# Patient Record
Sex: FEMALE
[De-identification: names, ages, dates, MRNs, and addresses within clinical notes are randomized per-mention and may not be internally consistent; named-entity substitution may affect disease eponyms.]

---

## 2024-04-01 PROBLEM — Z00.00 ENCOUNTER FOR PREVENTIVE HEALTH EXAMINATION: Status: ACTIVE | Noted: 2024-04-01

## 2024-04-02 ENCOUNTER — APPOINTMENT (OUTPATIENT)
Dept: OTOLARYNGOLOGY | Facility: CLINIC | Age: 44
End: 2024-04-02
Payer: COMMERCIAL

## 2024-04-02 DIAGNOSIS — Z82.49 FAMILY HISTORY OF ISCHEMIC HEART DISEASE AND OTHER DISEASES OF THE CIRCULATORY SYSTEM: ICD-10-CM

## 2024-04-02 DIAGNOSIS — J32.4 CHRONIC PANSINUSITIS: ICD-10-CM

## 2024-04-02 DIAGNOSIS — R51.9 HEADACHE, UNSPECIFIED: ICD-10-CM

## 2024-04-02 DIAGNOSIS — Z80.9 FAMILY HISTORY OF MALIGNANT NEOPLASM, UNSPECIFIED: ICD-10-CM

## 2024-04-02 DIAGNOSIS — Z78.9 OTHER SPECIFIED HEALTH STATUS: ICD-10-CM

## 2024-04-02 DIAGNOSIS — Z83.3 FAMILY HISTORY OF DIABETES MELLITUS: ICD-10-CM

## 2024-04-02 PROCEDURE — 99202 OFFICE O/P NEW SF 15 MIN: CPT | Mod: 25

## 2024-04-02 PROCEDURE — 31231 NASAL ENDOSCOPY DX: CPT

## 2024-04-02 NOTE — ASSESSMENT
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  She has associated mid facial pressure, intermittent pain in her left perinasal upper lip area as well as photophobia and neck pain.  They are not always associated. She had MRI that did not note any sinus disease. I would not recommend additional imaging or further dental workup at this time. What I would recommend is a formal headache/facial pain syndrome workup.  The possibility of a component of trigeminal neuralgia also has to be considered.

## 2024-04-02 NOTE — HISTORY OF PRESENT ILLNESS
[de-identified] : Initial visit. Chief complaint is "possible sinus issues/sensitivity on the left side affecting gums.  Also sinus headaches".  Very communicative and reliable historian and reports that she was in her otherwise state of fine health when approximately 7 months ago she had a spontaneous episode of discomfort in her left perinasal upper lip area when she smiled.  The problem resolved.  Has been on and off since that time.  In September it was very severe.  Had extensive workup including an MRI that she reports was negative.  The report and films not available for review. She also reports that she was seen by her dentist and endodontist and those evaluations did not glean a diagnosis. She was more recently at her dentist for dental cleaning and he suggested she consider an ear nose and throat doctor evaluation.  She also reports that she gets "sinus headaches".  Particular with change in weather.  She describes pain in her frontal area with associated photophobia and neck pain.

## 2024-04-02 NOTE — PROCEDURE
[FreeTextEntry6] : PROCEDURE NOTES  PROCEDURE: Nasal endoscopy  SURGEON: Dr. Brannon INDICATIONS: Assess for chronic sinusitis.  ANESTHESIA: The patient was placed in a sitting position.  Following application of the topical anesthetic and decongestant, exam was performed with a zero degree endoscope.  The scope was passed along the right nasal floor to the nasopharynx.  It was then passed into the region of the middle meatus, middle turbinate, and sphenoethmoid region.  An identical procedure was performed on the left side.  The following findings were noted:  The nasal mucosa was healthy appearing and the septum was roughly midline. The middle meatus and sphenoethmoid recesses were clear bilaterally. The Superior meatus was without lesion or infection.  The Inferior, Middle and Superior Turbinates were not enlarged and healthy in appearance.  There was not pus, polyps or mass.  The nasopharynx was normal.